# Patient Record
Sex: MALE | Race: WHITE | Employment: FULL TIME | URBAN - METROPOLITAN AREA
[De-identification: names, ages, dates, MRNs, and addresses within clinical notes are randomized per-mention and may not be internally consistent; named-entity substitution may affect disease eponyms.]

---

## 2021-12-13 RX ORDER — CEFAZOLIN SODIUM/WATER 2 G/20 ML
2 SYRINGE (ML) INTRAVENOUS ONCE
Status: CANCELLED | OUTPATIENT
Start: 2021-12-13 | End: 2021-12-13

## 2021-12-15 ENCOUNTER — ANESTHESIA EVENT (OUTPATIENT)
Dept: SURGERY | Age: 66
End: 2021-12-15
Payer: COMMERCIAL

## 2021-12-16 ENCOUNTER — APPOINTMENT (OUTPATIENT)
Dept: GENERAL RADIOLOGY | Age: 66
End: 2021-12-16
Attending: ORTHOPAEDIC SURGERY
Payer: COMMERCIAL

## 2021-12-16 ENCOUNTER — HOSPITAL ENCOUNTER (OUTPATIENT)
Age: 66
Setting detail: OUTPATIENT SURGERY
Discharge: HOME | End: 2021-12-16
Attending: ORTHOPAEDIC SURGERY | Admitting: ORTHOPAEDIC SURGERY
Payer: COMMERCIAL

## 2021-12-16 ENCOUNTER — ANESTHESIA (OUTPATIENT)
Dept: SURGERY | Age: 66
End: 2021-12-16
Payer: COMMERCIAL

## 2021-12-16 VITALS
DIASTOLIC BLOOD PRESSURE: 97 MMHG | RESPIRATION RATE: 12 BRPM | OXYGEN SATURATION: 97 % | SYSTOLIC BLOOD PRESSURE: 169 MMHG | BODY MASS INDEX: 40.62 KG/M2 | HEART RATE: 74 BPM | WEIGHT: 315 LBS | TEMPERATURE: 98.1 F

## 2021-12-16 DIAGNOSIS — G89.18 ACUTE POST-OPERATIVE PAIN: Primary | ICD-10-CM

## 2021-12-16 LAB
GLUCOSE BLD STRIP.AUTO-MCNC: 113 MG/DL (ref 65–100)
POTASSIUM BLD-SCNC: 3.9 MMOL/L (ref 3.5–5.1)
SERVICE CMNT-IMP: ABNORMAL

## 2021-12-16 PROCEDURE — 74011250637 HC RX REV CODE- 250/637: Performed by: ANESTHESIOLOGY

## 2021-12-16 PROCEDURE — 77030002933 HC SUT MCRYL J&J -A: Performed by: ORTHOPAEDIC SURGERY

## 2021-12-16 PROCEDURE — 2709999900 HC NON-CHARGEABLE SUPPLY: Performed by: ORTHOPAEDIC SURGERY

## 2021-12-16 PROCEDURE — 76210000021 HC REC RM PH II 0.5 TO 1 HR: Performed by: ORTHOPAEDIC SURGERY

## 2021-12-16 PROCEDURE — 76060000032 HC ANESTHESIA 0.5 TO 1 HR: Performed by: ORTHOPAEDIC SURGERY

## 2021-12-16 PROCEDURE — 20680 REMOVAL OF IMPLANT DEEP: CPT | Performed by: ORTHOPAEDIC SURGERY

## 2021-12-16 PROCEDURE — 82962 GLUCOSE BLOOD TEST: CPT

## 2021-12-16 PROCEDURE — 74011250636 HC RX REV CODE- 250/636: Performed by: ORTHOPAEDIC SURGERY

## 2021-12-16 PROCEDURE — 84132 ASSAY OF SERUM POTASSIUM: CPT

## 2021-12-16 PROCEDURE — 74011250636 HC RX REV CODE- 250/636: Performed by: ANESTHESIOLOGY

## 2021-12-16 PROCEDURE — 74011000250 HC RX REV CODE- 250: Performed by: ORTHOPAEDIC SURGERY

## 2021-12-16 PROCEDURE — 76010000154 HC OR TIME FIRST 0.5 HR: Performed by: ORTHOPAEDIC SURGERY

## 2021-12-16 PROCEDURE — 74011000250 HC RX REV CODE- 250: Performed by: NURSE ANESTHETIST, CERTIFIED REGISTERED

## 2021-12-16 PROCEDURE — 76210000063 HC OR PH I REC FIRST 0.5 HR: Performed by: ORTHOPAEDIC SURGERY

## 2021-12-16 PROCEDURE — 74011250636 HC RX REV CODE- 250/636: Performed by: NURSE ANESTHETIST, CERTIFIED REGISTERED

## 2021-12-16 PROCEDURE — 77030000032 HC CUF TRNQT ZIMM -B: Performed by: ORTHOPAEDIC SURGERY

## 2021-12-16 RX ORDER — OXYCODONE HYDROCHLORIDE 5 MG/1
5 TABLET ORAL
Status: DISCONTINUED | OUTPATIENT
Start: 2021-12-16 | End: 2021-12-16 | Stop reason: HOSPADM

## 2021-12-16 RX ORDER — HYDROMORPHONE HYDROCHLORIDE 2 MG/ML
0.5 INJECTION, SOLUTION INTRAMUSCULAR; INTRAVENOUS; SUBCUTANEOUS
Status: DISCONTINUED | OUTPATIENT
Start: 2021-12-16 | End: 2021-12-16 | Stop reason: HOSPADM

## 2021-12-16 RX ORDER — ONDANSETRON 2 MG/ML
4 INJECTION INTRAMUSCULAR; INTRAVENOUS
Status: DISCONTINUED | OUTPATIENT
Start: 2021-12-16 | End: 2021-12-16 | Stop reason: HOSPADM

## 2021-12-16 RX ORDER — ACETAMINOPHEN 500 MG
1000 TABLET ORAL ONCE
Status: COMPLETED | OUTPATIENT
Start: 2021-12-16 | End: 2021-12-16

## 2021-12-16 RX ORDER — SODIUM CHLORIDE 0.9 % (FLUSH) 0.9 %
5-40 SYRINGE (ML) INJECTION EVERY 8 HOURS
Status: DISCONTINUED | OUTPATIENT
Start: 2021-12-16 | End: 2021-12-16 | Stop reason: HOSPADM

## 2021-12-16 RX ORDER — SODIUM CHLORIDE 0.9 % (FLUSH) 0.9 %
5-40 SYRINGE (ML) INJECTION AS NEEDED
Status: DISCONTINUED | OUTPATIENT
Start: 2021-12-16 | End: 2021-12-16 | Stop reason: HOSPADM

## 2021-12-16 RX ORDER — ALBUTEROL SULFATE 0.83 MG/ML
2.5 SOLUTION RESPIRATORY (INHALATION) AS NEEDED
Status: DISCONTINUED | OUTPATIENT
Start: 2021-12-16 | End: 2021-12-16 | Stop reason: HOSPADM

## 2021-12-16 RX ORDER — MIDAZOLAM HYDROCHLORIDE 1 MG/ML
2 INJECTION, SOLUTION INTRAMUSCULAR; INTRAVENOUS
Status: DISCONTINUED | OUTPATIENT
Start: 2021-12-16 | End: 2021-12-16 | Stop reason: HOSPADM

## 2021-12-16 RX ORDER — KETOROLAC TROMETHAMINE 30 MG/ML
INJECTION, SOLUTION INTRAMUSCULAR; INTRAVENOUS AS NEEDED
Status: DISCONTINUED | OUTPATIENT
Start: 2021-12-16 | End: 2021-12-16 | Stop reason: HOSPADM

## 2021-12-16 RX ORDER — BUPIVACAINE HYDROCHLORIDE 5 MG/ML
INJECTION, SOLUTION EPIDURAL; INTRACAUDAL AS NEEDED
Status: DISCONTINUED | OUTPATIENT
Start: 2021-12-16 | End: 2021-12-16 | Stop reason: HOSPADM

## 2021-12-16 RX ORDER — PROPOFOL 10 MG/ML
INJECTION, EMULSION INTRAVENOUS AS NEEDED
Status: DISCONTINUED | OUTPATIENT
Start: 2021-12-16 | End: 2021-12-16 | Stop reason: HOSPADM

## 2021-12-16 RX ORDER — LIDOCAINE HYDROCHLORIDE 10 MG/ML
0.1 INJECTION INFILTRATION; PERINEURAL AS NEEDED
Status: DISCONTINUED | OUTPATIENT
Start: 2021-12-16 | End: 2021-12-16 | Stop reason: HOSPADM

## 2021-12-16 RX ORDER — SODIUM CHLORIDE, SODIUM LACTATE, POTASSIUM CHLORIDE, CALCIUM CHLORIDE 600; 310; 30; 20 MG/100ML; MG/100ML; MG/100ML; MG/100ML
1000 INJECTION, SOLUTION INTRAVENOUS CONTINUOUS
Status: DISCONTINUED | OUTPATIENT
Start: 2021-12-16 | End: 2021-12-16 | Stop reason: HOSPADM

## 2021-12-16 RX ORDER — CEPHALEXIN 500 MG/1
500 CAPSULE ORAL 4 TIMES DAILY
Qty: 12 CAPSULE | Refills: 0 | Status: SHIPPED | OUTPATIENT
Start: 2021-12-16

## 2021-12-16 RX ORDER — HYDROCODONE BITARTRATE AND ACETAMINOPHEN 5; 325 MG/1; MG/1
1 TABLET ORAL
Qty: 20 TABLET | Refills: 0 | Status: SHIPPED | OUTPATIENT
Start: 2021-12-16 | End: 2021-12-21

## 2021-12-16 RX ORDER — LIDOCAINE HYDROCHLORIDE 20 MG/ML
INJECTION, SOLUTION EPIDURAL; INFILTRATION; INTRACAUDAL; PERINEURAL AS NEEDED
Status: DISCONTINUED | OUTPATIENT
Start: 2021-12-16 | End: 2021-12-16 | Stop reason: HOSPADM

## 2021-12-16 RX ADMIN — KETOROLAC TROMETHAMINE 30 MG: 30 INJECTION, SOLUTION INTRAMUSCULAR at 14:21

## 2021-12-16 RX ADMIN — ACETAMINOPHEN 1000 MG: 500 TABLET ORAL at 11:18

## 2021-12-16 RX ADMIN — PROPOFOL 120 MCG/KG/MIN: 10 INJECTION, EMULSION INTRAVENOUS at 14:02

## 2021-12-16 RX ADMIN — LIDOCAINE HYDROCHLORIDE 60 MG: 20 INJECTION, SOLUTION EPIDURAL; INFILTRATION; INTRACAUDAL; PERINEURAL at 14:00

## 2021-12-16 RX ADMIN — Medication 3 G: at 14:06

## 2021-12-16 RX ADMIN — PROPOFOL 50 MG: 10 INJECTION, EMULSION INTRAVENOUS at 14:01

## 2021-12-16 RX ADMIN — SODIUM CHLORIDE, SODIUM LACTATE, POTASSIUM CHLORIDE, AND CALCIUM CHLORIDE 1000 ML: 600; 310; 30; 20 INJECTION, SOLUTION INTRAVENOUS at 11:19

## 2021-12-16 NOTE — H&P
Outpatient Surgery History and Physical:  Raymond Stone was seen and examined. CHIEF COMPLAINT:   Left ankle. PE:     Visit Vitals  BP (!) 157/88 (BP 1 Location: Right arm, BP Patient Position: Sitting)   Temp 98.6 °F (37 °C)   Resp 18   Wt 325 lb (147.4 kg)   SpO2 97%   BMI 40.62 kg/m²       Heart:   Regular rhythm      Lungs:  Are clear      Past Medical History: There are no problems to display for this patient. Surgical History:   Past Surgical History:   Procedure Laterality Date    HX COLONOSCOPY      x4     HX LAP CHOLECYSTECTOMY  2018    HX ORTHOPAEDIC      left ankle screws    HX OTHER SURGICAL      cysts on back removed outpatient     HX TONSILLECTOMY         Social History: Patient  reports that he has never smoked. He has never used smokeless tobacco. He reports current alcohol use of about 3.0 standard drinks of alcohol per week. He reports previous drug use. Family History: No family history on file. Allergies: Reviewed per EMR  Allergies   Allergen Reactions    Codeine Itching       Medications:    No current facility-administered medications on file prior to encounter. No current outpatient medications on file prior to encounter. The surgery is planned for the LEFT ANKLE HARDWARE REMOVAL . History and physical has been reviewed. The patient has been examined. There have been no significant clinical changes since the completion of the originally dated History and Physical.  Patient identified by surgeon; surgical site was confirmed by patient and surgeon. The patient is here today for outpatient surgery. I have examined the patient, no changes are noted in the patient's medical status. Necessity for the procedure/care is still present and the history and physical above is current. See the office notes for the full long term history of the problem. Please see the recent office notes for the musculoskeletal examination.     Signed By: Shani Feliciano DEBBIE     December 16, 2021 10:45 AM

## 2021-12-16 NOTE — BRIEF OP NOTE
Brief Postoperative Note    Patient: Kermit Martinez  YOB: 1955  MRN: [id number]    Date of Procedure: 12/16/2021     Pre-Op Diagnosis: Painful orthopaedic hardware [facility]) [Y88.64ZD]  Post-traumatic arthritis of left ankle [M19.172]    Post-Op Diagnosis: Same as preoperative diagnosis.       Procedure(s):  LEFT ANKLE HARDWARE REMOVAL LOCAL W/ MAC    Surgeon(s):  Lani Doss MD    Surgical Assistant: Nurse Practitioner: Ruben Diallo, Bishnu Scott RN    Anesthesia: MAC     Estimated Blood Loss (mL): Minimal    Complications: None    Specimens: * No specimens in log *     Implants: * No implants in log *    Drains: * No LDAs found *    Findings:     Electronically Signed by Arsenio Paget, MD on 12/16/2021 at 2:23 PM

## 2021-12-16 NOTE — DISCHARGE INSTRUCTIONS
INSTRUCTIONS FOLLOWING FOOT SURGERY    ACTIVITY  Elevate foot. No Ice    2.)Protected partial weight bearing on the heel only as tolerated in post op shoe after full sensation returns. Blood clot prevention:  As instructed by Dr Nicole Helton: Take one 325mg aspirin daily if okay with your medical doctor and you have no GI ulcer. Get up and out of bed frequently. While in bed move the legs as much as possible. DRESSING CARE Keep dry and in place until follow up appointment. Cover with cast bag or plastic bag when showering. Let the office know if dressing gets saturated with water. Don't put anything into the splint to relieve itching etc.     CALL YOUR DOCTOR IF YOU HAVE  Excessive bleeding that does not stop after holding mild pressure over the area. Temperature of 101 degrees or above. Redness, excessive swelling or bruising, and/or green or yellow, smelly discharge from incision. Loss of sensation - cold, white or blue toes. DIET  Day of Surgery: Clear liquids until no nausea or vomiting; then light, bland diet (Baked chicken, plain rice, grits, scrambled egg, toast). Nothing Greasy, fried or spicy today  Advance to regular diet on second day, unless your doctor orders otherwise. PAIN  Take pain medications as directed by your doctor. Call your doctor if pain is NOT relieved by medication. MEDICATION INTERACTION:  During your procedure you potentially received a medication or medications which may reduce the effectiveness of oral contraceptives. Please consider other forms of contraception for 1 month following your procedure if you are currently using oral contraceptives as your primary form of birth control.  In addition to this, we recommend continuing your oral contraceptive as prescribed, unless otherwise instructed by your physician, during this time    After general anesthesia or intravenous sedation, for 24 hours or while taking prescription Narcotics:  · Limit your activities  · A responsible adult needs to be with you for the next 24 hours  · Do not drive and operate hazardous machinery  · Do not make important personal or business decisions  · Do not drink alcoholic beverages  · If you have not urinated within 8 hours after discharge, and you are experiencing discomfort from urinary retention, please go to the nearest ED. · If you have sleep apnea and have a CPAP machine, please use it for all naps and sleeping. · Please use caution when taking narcotics and any of your home medications that may cause drowsiness. *  Please give a list of your current medications to your Primary Care Provider. *  Please update this list whenever your medications are discontinued, doses are      changed, or new medications (including over-the-counter products) are added. *  Please carry medication information at all times in case of emergency situations. These are general instructions for a healthy lifestyle:  No smoking/ No tobacco products/ Avoid exposure to second hand smoke  Surgeon General's Warning:  Quitting smoking now greatly reduces serious risk to your health. Obesity, smoking, and sedentary lifestyle greatly increases your risk for illness  A healthy diet, regular physical exercise & weight monitoring are important for maintaining a healthy lifestyle    You may be retaining fluid if you have a history of heart failure or if you experience any of the following symptoms:  Weight gain of 3 pounds or more overnight or 5 pounds in a week, increased swelling in our hands or feet or shortness of breath while lying flat in bed. Please call your doctor as soon as you notice any of these symptoms; do not wait until your next office visit.

## 2021-12-16 NOTE — OP NOTES
FULL OP NOTE    PATIENT NAME: Melissa Downing  MRN: [id number]    DATE OF SURGERY: 12/16/2021    PREOPERATIVE DIAGNOSIS: Painful orthopaedic hardware [facility]) [T84.84XA]  Post-traumatic arthritis of left ankle [M19.172]      POSTOPERATIVE DIAGNOSIS: Painful orthopaedic hardware [facility]) [T84.84XA]  Post-traumatic arthritis of left ankle [M19.172]      PROCEDURE: Left ankle hardware removal, 20680    SURGEON: Marin Perze MD    ASSISTANT: Nova Dobbs NP  An assistant was required for positioning, retraction, and wound closure for this procedure. HARDWARE: * No implants in log *  INDICATIONS: This patient is a 60y.o. year old male with a history of Painful orthopaedic hardware ([address] [T84.84XA]  Post-traumatic arthritis of left ankle [M19.172] who has failed conservative therapy and desires surgical treatment. Risks and benefits of the procedure including, but not limited to, anesthetic complications as well as surgical complications including damage to nerves and blood vessels, risk of infection, risk of incomplete pain relief, risk of malunion, nonunion and need for additional surgery have been discussed with the patient who wishes to proceed. PROCEDURE IN DETAIL: A time out was done to confirm the operating procedure, surgeon, patient and site. During a preop surgical timeout the left lower extremity was identified as the correct surgical site and prepped and draped in a standard sterile fashion using ChloraPrep solution. A field block was obtained with 0.75 percent plain Marcaine. A small portion of the patient's previous lateral approach to the fibula was an open. The underlying 3 syndesmosis screws were removed without difficulty. These were confirmed be adequately viewed under image intensification. The wound was then irrigated and closed using Monocryl nylon sutures. A sterile dressing was then applied. Anesthesia was discontinued. The patient was transferred back to recovery bed.   He was taken to recovery in satisfactory condition. Appeared to tolerate the procedure well. There were no apparent trouble or anesthetic complications. All needle and sponge counts were correct. TOURNIQUET TIME: Approx 10 minutes. SPECIMENS: none    ESTIMATED BLOOD LOSS: min mL.

## 2021-12-16 NOTE — ANESTHESIA PREPROCEDURE EVALUATION
Relevant Problems   No relevant active problems       Anesthetic History   No history of anesthetic complications            Review of Systems / Medical History  Patient summary reviewed and pertinent labs reviewed    Pulmonary        Sleep apnea           Neuro/Psych   Within defined limits           Cardiovascular    Hypertension              Exercise tolerance: >4 METS     GI/Hepatic/Renal     GERD           Endo/Other    Diabetes: well controlled    Morbid obesity and arthritis     Other Findings              Physical Exam    Airway  Mallampati: II  TM Distance: 4 - 6 cm         Cardiovascular    Rhythm: regular  Rate: normal         Dental  No notable dental hx       Pulmonary  Breath sounds clear to auscultation               Abdominal         Other Findings            Anesthetic Plan    ASA: 3  Anesthesia type: total IV anesthesia          Induction: Intravenous  Anesthetic plan and risks discussed with: Patient      Localized by surgeon

## 2021-12-16 NOTE — ANESTHESIA POSTPROCEDURE EVALUATION
Procedure(s):  LEFT ANKLE HARDWARE REMOVAL LOCAL W/ MAC.    total IV anesthesia    Anesthesia Post Evaluation      Multimodal analgesia: multimodal analgesia used between 6 hours prior to anesthesia start to PACU discharge  Patient location during evaluation: bedside  Patient participation: complete - patient participated  Level of consciousness: awake and responsive to light touch  Pain management: adequate  Airway patency: patent  Anesthetic complications: no  Cardiovascular status: acceptable, hemodynamically stable, blood pressure returned to baseline and stable  Respiratory status: acceptable, unassisted, spontaneous ventilation and nonlabored ventilation  Hydration status: acceptable  Post anesthesia nausea and vomiting:  none  Final Post Anesthesia Temperature Assessment:  Normothermia (36.0-37.5 degrees C)      INITIAL Post-op Vital signs:   Vitals Value Taken Time   /89 12/16/21 1440   Temp 36.7 °C (98.1 °F) 12/16/21 1430   Pulse 70 12/16/21 1442   Resp 12 12/16/21 1430   SpO2 98 % 12/16/21 1442   Vitals shown include unvalidated device data.

## 2022-07-29 ENCOUNTER — OFFICE VISIT (OUTPATIENT)
Dept: ORTHOPEDIC SURGERY | Age: 67
End: 2022-07-29
Payer: COMMERCIAL

## 2022-07-29 DIAGNOSIS — M19.079 ANKLE ARTHRITIS: Primary | ICD-10-CM

## 2022-07-29 PROCEDURE — 20605 DRAIN/INJ JOINT/BURSA W/O US: CPT | Performed by: ORTHOPAEDIC SURGERY

## 2022-07-29 RX ORDER — METHYLPREDNISOLONE ACETATE 40 MG/ML
40 INJECTION, SUSPENSION INTRA-ARTICULAR; INTRALESIONAL; INTRAMUSCULAR; SOFT TISSUE ONCE
Status: COMPLETED | OUTPATIENT
Start: 2022-07-29 | End: 2022-07-29

## 2022-07-29 RX ADMIN — METHYLPREDNISOLONE ACETATE 40 MG: 40 INJECTION, SUSPENSION INTRA-ARTICULAR; INTRALESIONAL; INTRAMUSCULAR; SOFT TISSUE at 10:08

## 2023-05-08 ENCOUNTER — OFFICE VISIT (OUTPATIENT)
Dept: ORTHOPEDIC SURGERY | Age: 68
End: 2023-05-08

## 2023-05-08 DIAGNOSIS — M19.079 ANKLE ARTHRITIS: Primary | ICD-10-CM

## 2023-05-08 RX ORDER — METHYLPREDNISOLONE ACETATE 40 MG/ML
40 INJECTION, SUSPENSION INTRA-ARTICULAR; INTRALESIONAL; INTRAMUSCULAR; SOFT TISSUE ONCE
Status: COMPLETED | OUTPATIENT
Start: 2023-05-08 | End: 2023-05-08

## 2023-05-08 RX ADMIN — METHYLPREDNISOLONE ACETATE 40 MG: 40 INJECTION, SUSPENSION INTRA-ARTICULAR; INTRALESIONAL; INTRAMUSCULAR; SOFT TISSUE at 13:41

## 2023-05-08 NOTE — PROGRESS NOTES
Name: Basil Armendariz  YOB: 1955  Gender: male  MRN: [id number]    Summary:   Left posttraumatic ankle arthritis       CC: Follow-up (Left ankle pain)       HPI: Basil Armendariz is a 78 y.o. male who presents with Follow-up (Left ankle pain)  . This patient presents back to the office today with complaints of left ankle arthritis pain    History was obtained by Patient     ROS/Meds/PSH/PMH/FH/SH: I personally reviewed the patients standard intake form. Below are the pertinents    Tobacco:  reports that he has never smoked. He has never used smokeless tobacco.  Diabetes: None      Physical Examination:    Exam of the left foot and ankle shows some limited range of motion the tibiotalar joint with anterior medial ankle joint pain    Imaging:   No imaging reviewed           Quita Interiano III, MD           Assessment:   Left posttraumatic ankle arthritis    Treatment Plan:   4 This is a chronic illness/condition with exacerbation and progression  Treatment at this time: Minor Procedure: Injection done today: The patient understands the risks and complications associated with injection. After sterile prep of the area, the Left ankle joint was injected with 3 cc. of Xylocaine and 3 cc. of steroid. . 40mg Depo Medrol was the steroid used. Patient tolerated it well. I discussed the risk of infection and skin blanching. I told the be patient be careful about the symptoms of hyperglycemia such as GI distress, polyuria, excessive thirst and lethargy. If these symptoms occur they should present to an primary care doctor or urgent facility  Studies ordered: NO XR needed @ Next Visit    Weight-bearing status: WBAT        Return to work/work restrictions: none  No medications given    A decision was made today to proceed with injection into the left ankle which was performed out difficulty. We discussed continued conservative treatment for him over ankle fusion.

## 2023-10-06 ENCOUNTER — OFFICE VISIT (OUTPATIENT)
Dept: ORTHOPEDIC SURGERY | Age: 68
End: 2023-10-06

## 2023-10-06 DIAGNOSIS — M19.079 ANKLE ARTHRITIS: Primary | ICD-10-CM

## 2023-10-06 RX ORDER — CALCIUM CITRATE/VITAMIN D3 200MG-6.25
TABLET ORAL
COMMUNITY
Start: 2023-10-01

## 2023-10-06 RX ORDER — LIRAGLUTIDE 6 MG/ML
INJECTION SUBCUTANEOUS
COMMUNITY
Start: 2023-09-12

## 2023-10-06 RX ORDER — PREGABALIN 100 MG/1
CAPSULE ORAL
COMMUNITY
Start: 2023-09-19

## 2023-10-06 RX ORDER — LOSARTAN POTASSIUM AND HYDROCHLOROTHIAZIDE 25; 100 MG/1; MG/1
1 TABLET ORAL DAILY
COMMUNITY
Start: 2021-09-16

## 2023-10-06 RX ORDER — PEN NEEDLE, DIABETIC 32GX 5/32"
NEEDLE, DISPOSABLE MISCELLANEOUS
COMMUNITY
Start: 2023-08-14

## 2023-10-06 RX ORDER — AMLODIPINE BESYLATE 5 MG/1
TABLET ORAL
COMMUNITY
Start: 2023-09-25

## 2023-10-06 RX ORDER — METHYLPREDNISOLONE ACETATE 40 MG/ML
40 INJECTION, SUSPENSION INTRA-ARTICULAR; INTRALESIONAL; INTRAMUSCULAR; SOFT TISSUE ONCE
Status: COMPLETED | OUTPATIENT
Start: 2023-10-06 | End: 2023-10-06

## 2023-10-06 RX ORDER — ALBUTEROL SULFATE 2.5 MG/3ML
2.5 SOLUTION RESPIRATORY (INHALATION) EVERY 6 HOURS PRN
COMMUNITY
Start: 2023-01-27

## 2023-10-06 RX ADMIN — METHYLPREDNISOLONE ACETATE 40 MG: 40 INJECTION, SUSPENSION INTRA-ARTICULAR; INTRALESIONAL; INTRAMUSCULAR; SOFT TISSUE at 09:29

## 2023-11-07 ENCOUNTER — TELEPHONE (OUTPATIENT)
Dept: ORTHOPEDIC SURGERY | Age: 68
End: 2023-11-07

## 2023-11-07 NOTE — TELEPHONE ENCOUNTER
Please call patient,  said shot didn't help at all , didn't know what he can do , is there a different type of inj

## 2023-11-07 NOTE — TELEPHONE ENCOUNTER
Patient states the injection he had on 10-06-23 did not help at all. Patient is Type II diabetic and wonders if he can have a MDP. I told patient I would speak to Dr. Lissa Nicole tomorrow and call him back.

## 2023-11-08 NOTE — TELEPHONE ENCOUNTER
Due to patient having Type II diabetes he will be unable to have a MDP. Per Dr. Gilford Fleck he can try a brace Edy Tay or [place] brace) or he will need to discuss an ankle fusion.

## 2023-11-13 ENCOUNTER — TELEPHONE (OUTPATIENT)
Dept: ORTHOPEDIC SURGERY | Age: 68
End: 2023-11-13

## 2023-11-13 NOTE — TELEPHONE ENCOUNTER
He states he never heard back from anyone regarding the injection not helping. I read the note from [facility] and he states he needs an appointment to be seen. Call transferred to appointments.

## 2023-11-22 ENCOUNTER — OFFICE VISIT (OUTPATIENT)
Dept: ORTHOPEDIC SURGERY | Age: 68
End: 2023-11-22
Payer: MEDICARE

## 2023-11-22 DIAGNOSIS — M19.079 ANKLE ARTHRITIS: ICD-10-CM

## 2023-11-22 DIAGNOSIS — M19.172 POST-TRAUMATIC OSTEOARTHRITIS, LEFT ANKLE AND FOOT: Primary | ICD-10-CM

## 2023-11-22 PROCEDURE — 3017F COLORECTAL CA SCREEN DOC REV: CPT | Performed by: ORTHOPAEDIC SURGERY

## 2023-11-22 PROCEDURE — 4004F PT TOBACCO SCREEN RCVD TLK: CPT | Performed by: ORTHOPAEDIC SURGERY

## 2023-11-22 PROCEDURE — 99214 OFFICE O/P EST MOD 30 MIN: CPT | Performed by: ORTHOPAEDIC SURGERY

## 2023-11-22 PROCEDURE — 1123F ACP DISCUSS/DSCN MKR DOCD: CPT | Performed by: ORTHOPAEDIC SURGERY

## 2023-11-22 PROCEDURE — 20605 DRAIN/INJ JOINT/BURSA W/O US: CPT | Performed by: ORTHOPAEDIC SURGERY

## 2023-11-22 PROCEDURE — G8421 BMI NOT CALCULATED: HCPCS | Performed by: ORTHOPAEDIC SURGERY

## 2023-11-22 PROCEDURE — G8428 CUR MEDS NOT DOCUMENT: HCPCS | Performed by: ORTHOPAEDIC SURGERY

## 2023-11-22 PROCEDURE — G8484 FLU IMMUNIZE NO ADMIN: HCPCS | Performed by: ORTHOPAEDIC SURGERY

## 2023-11-22 RX ORDER — METHYLPREDNISOLONE ACETATE 40 MG/ML
40 INJECTION, SUSPENSION INTRA-ARTICULAR; INTRALESIONAL; INTRAMUSCULAR; SOFT TISSUE ONCE
Status: COMPLETED | OUTPATIENT
Start: 2023-11-22 | End: 2023-11-22

## 2023-11-22 RX ADMIN — METHYLPREDNISOLONE ACETATE 40 MG: 40 INJECTION, SUSPENSION INTRA-ARTICULAR; INTRALESIONAL; INTRAMUSCULAR; SOFT TISSUE at 11:53

## 2023-11-22 NOTE — PROGRESS NOTES
Name: Lesly Montoya  YOB: 1955  Gender: male  MRN: [id number]    Summary:   Left end-stage ankle arthritis       CC: Follow-up (Recheck left ankle )       HPI: Lesly Montoya is a 78 y.o. male who presents with Follow-up (Recheck left ankle )  . This patient presents back to the office today with continued complaints of left ankle pain and swelling with his known history of arthritis. The last injection was not very helpful and actually her removal a bit more than usual.    History was obtained by Patient     ROS/Meds/PSH/PMH/FH/SH: I personally reviewed the patients standard intake form. Below are the pertinents    Tobacco:  reports that he has never smoked. He has never used smokeless tobacco.  Diabetes: None      Physical Examination:    The left foot and ankle shows very limited range of motion of tibiotalar joint. He has tenderness to palpation at the anteromedial joint line. There are palpable pulses and intact sensation. Imaging:   No imaging reviewed          Assessment:   Left end-stage ankle arthritis    Treatment Plan:   4 This is a chronic illness/condition with exacerbation and progression  Treatment at this time: Minor Procedure: Injection done today: The patient understands the risks and complications associated with injection. After sterile prep of the area, the Left ankle joint was injected with 3 cc. of Xylocaine and 3 cc. of steroid. . 40mg Depo Medrol was the steroid used. Patient tolerated it well. I discussed the risk of infection and skin blanching. I told the be patient be careful about the symptoms of hyperglycemia such as GI distress, polyuria, excessive thirst and lethargy.   If these symptoms occur they should present to an primary care doctor or urgent facility  Studies ordered: NO XR needed @ Next Visit    Weight-bearing status: WBAT        Return to work/work restrictions: none  No medications given    A decision was made today to proceed with injection of the

## 2024-02-29 RX ORDER — BLOOD PRESSURE TEST KIT
KIT MISCELLANEOUS DAILY
COMMUNITY
Start: 2023-05-09

## 2024-02-29 RX ORDER — PEN NEEDLE, DIABETIC 32GX 5/32"
NEEDLE, DISPOSABLE MISCELLANEOUS DAILY
COMMUNITY
Start: 2023-05-11

## 2024-03-01 ENCOUNTER — OFFICE VISIT (OUTPATIENT)
Dept: ORTHOPEDIC SURGERY | Age: 69
End: 2024-03-01

## 2024-03-01 DIAGNOSIS — M19.079 ANKLE ARTHRITIS: ICD-10-CM

## 2024-03-01 DIAGNOSIS — M25.572 LEFT ANKLE PAIN, UNSPECIFIED CHRONICITY: Primary | ICD-10-CM

## 2024-03-01 NOTE — PROGRESS NOTES
Name: Dirk John  YOB: 1955  Gender: male  MRN: [id number]    Summary:   Left end-stage hindfoot arthritis       CC: Follow-up (Left ankle)       HPI: Dirk John is a 68 y.o. male who presents with Follow-up (Left ankle)  .  This patient returns back the office today with increasing complaints of pain located in his left hindfoot.  His last hemoglobin A1c was about 8%.    History was obtained by Patient     ROS/Meds/PSH/PMH/FH/SH: I personally reviewed the patients standard intake form.  Below are the pertinents    Tobacco:  reports that he has never smoked. He has never used smokeless tobacco.  Diabetes: Diabetic - non insulin      Physical Examination:    Exam of the left foot and ankle shows very limited tibiotalar and subtalar joint range of motion.    Imaging:   Interpretation of imaging  Left ankle XR: AP, Lateral, Oblique views     ICD-10-CM    1. Left ankle pain, unspecified chronicity  M25.572 XR ANKLE LEFT (MIN 3 VIEWS)      2. Ankle arthritis  M19.079          Report: AP, lateral, oblique x-ray of the left ankle demonstrates hindfoot arthritis    Impression: Hindfoot arthritis   CECILIO INMAN III, MD           Assessment:   Left hindfoot arthritis    Treatment Plan:   4 This is a chronic illness/condition with exacerbation and progression  Treatment at this time: Minor Procedure: Injection done today: The patient understands the risks and complications associated with injection. After sterile prep of the area, the Left ankle joint was injected with 3 cc. of Xylocaine and 3 cc. of steroid.. 40mg Depo Medrol was the steroid used.  Patient tolerated it well.  I discussed the risk of infection and skin blanching.  I told the be patient be careful about the symptoms of hyperglycemia such as GI distress, polyuria, excessive thirst and lethargy.  If these symptoms occur they should present to an primary care doctor or urgent facility  Studies ordered: NO XR needed @ Next

## 2024-07-17 ENCOUNTER — TELEPHONE (OUTPATIENT)
Dept: ORTHOPEDIC SURGERY | Age: 69
End: 2024-07-17

## 2024-07-17 NOTE — TELEPHONE ENCOUNTER
Please call patient,  he would like to come in before aug 5 for a ck and injection , he is going to [place]

## 2024-07-18 NOTE — TELEPHONE ENCOUNTER
Called and spoke with pt. Pt was scheduled for 07/31/24 @ [address] at [address] office. Pt voiced understanding.

## 2024-07-31 ENCOUNTER — OFFICE VISIT (OUTPATIENT)
Dept: ORTHOPEDIC SURGERY | Age: 69
End: 2024-07-31

## 2024-07-31 ENCOUNTER — PATIENT MESSAGE (OUTPATIENT)
Dept: ORTHOPEDIC SURGERY | Age: 69
End: 2024-07-31

## 2024-07-31 DIAGNOSIS — M19.072 PRIMARY OSTEOARTHRITIS OF LEFT FOOT: ICD-10-CM

## 2024-07-31 DIAGNOSIS — M19.172 POST-TRAUMATIC OSTEOARTHRITIS OF LEFT ANKLE: ICD-10-CM

## 2024-07-31 DIAGNOSIS — M25.572 LEFT ANKLE PAIN, UNSPECIFIED CHRONICITY: Primary | ICD-10-CM

## 2024-07-31 RX ORDER — METHYLPREDNISOLONE ACETATE 40 MG/ML
40 INJECTION, SUSPENSION INTRA-ARTICULAR; INTRALESIONAL; INTRAMUSCULAR; SOFT TISSUE ONCE
Status: COMPLETED | OUTPATIENT
Start: 2024-07-31 | End: 2024-07-31

## 2024-07-31 RX ADMIN — METHYLPREDNISOLONE ACETATE 80 MG: 40 INJECTION, SUSPENSION INTRA-ARTICULAR; INTRALESIONAL; INTRAMUSCULAR; SOFT TISSUE at 12:10

## 2024-07-31 NOTE — PROGRESS NOTES
Name: Dirk John  YOB: 1955  Gender: male  MRN: [id number]    CC: Left ankle pain    HPI:   07/31/2024: New patient visit: Left ankle pain    ROS/Meds/PSH/PMH/FH/SH: only reviewed pertinent/relevant information today    Tobacco:  reports that he has never smoked. He has never used smokeless tobacco.     Physical Examination:  Patient appears to be alert and oriented with acceptable appearance.  No obvious distress or SOB  CV: LE acceptable vascular flow, color and capillary refill  Neuro: LE mostly intact light touch sensation   Skin: Left = healed incisions; ankle/hindfoot thickening  MS: Standing: Plantigrade: Gait protected  Left = anterior ankle pain > lateral hindfoot pain  Left = limited ankle/hindfoot motion    XR: Left: Standing AP lateral mortise ankle plus AP oblique foot taken today with collapsed ankle arthritis, retained hardware with broken syndesmotic screws; hindfoot subtalar joint arthritis; arterial calcifications  XR Impression:  As above      Reviewed Test/Records/Documents:   03/01/2024: Dr. Tate: Reflects: Left end-stage hindfoot arthritis: Discussed hindfoot fusion doretha: Ankle injection    Timeout: I confirmed with the patient and Angelica:   All are in agreement with: Correct patient, planned procedure, procedure site and laterality: Left ankle joint  Injection: We discussed risks/complications of injection: After acceptance and sterile prep: Left ankle joint injected 2 cc Xylocaine, 80 mg Depo-Medrol; he appeared to do well    Assessment:    Left posttraumatic ankle arthritis with retained hardware  Left subtalar arthritis    Plan:   The patient and I discussed the above assessment. We explored treatment options.     He reports with dorsiflexion of his ankle and toes a sensation of pain in his posterior lateral ankle  I suspect that relates to FHL stretch through posterior arthritic ankle/hindfoot  He has no evidence of FHL or peroneal deficiencies    Radiographically and

## 2024-08-01 NOTE — TELEPHONE ENCOUNTER
Called and left message for pt. Pt was informed form will be ready for him to  tomorrow at the [address] office.

## 2024-08-28 ENCOUNTER — OFFICE VISIT (OUTPATIENT)
Dept: ORTHOPEDIC SURGERY | Age: 69
End: 2024-08-28

## 2024-08-28 VITALS — BODY MASS INDEX: 39.17 KG/M2 | HEIGHT: 75 IN | WEIGHT: 315 LBS

## 2024-08-28 DIAGNOSIS — M19.172 POST-TRAUMATIC OSTEOARTHRITIS OF LEFT ANKLE: ICD-10-CM

## 2024-08-28 DIAGNOSIS — M19.172 POST-TRAUMATIC OSTEOARTHRITIS, LEFT ANKLE AND FOOT: Primary | ICD-10-CM

## 2024-08-28 NOTE — PROGRESS NOTES
Name: Dirk John  YOB: 1955  Gender: male  MRN: [id number]    CC: Left ankle pain    HPI:   07/31/2024: New patient visit: Left ankle pain  08/28/2024: Left outer ankle/foot pain     ROS/Meds/PSH/PMH/FH/SH: only reviewed pertinent/relevant information today    Tobacco:  reports that he has never smoked. He has never used smokeless tobacco.     Physical Examination:  Patient appears to be alert and oriented with acceptable appearance.  No obvious distress or SOB  CV: LE acceptable vascular flow, color and capillary refill  Neuro: LE mostly intact light touch sensation   Skin: Left = healed incisions; ankle/hindfoot thickening  MS: Standing: Plantigrade: Gait full  Left = lateral hindfoot pain  Left = limited ankle/hindfoot motion    XR: Left: Standing AP lateral mortise ankle plus AP oblique foot taken 07/31/2024 with collapsed ankle arthritis, retained hardware with broken syndesmotic screws; hindfoot subtalar joint arthritis; arterial calcifications  XR Impression:  As above      Reviewed Test/Records/Documents:   03/01/2024: Dr. Tate: Reflects: Left end-stage hindfoot arthritis: Discussed hindfoot fusion doretha: Ankle injection  07/31/2024: Left ankle joint injected 2 cc Xylocaine, 80 mg Depo-Medrol; he appeared to do well    Timeout: I confirmed with the patient and Carol Ann:   All are in agreement with: Correct patient, planned procedure, procedure site and laterality: Left hindfoot joint  Injection: We discussed risks/complications of injection: After acceptance and sterile prep: Left hindfoot joint injected 2 cc Xylocaine, 80 mg Depo-Medrol; he appeared to do well    Assessment:    Left posttraumatic ankle arthritis with retained hardware  Left subtalar arthritis    Plan:   The patient and I discussed the above assessment. We explored treatment options.     07/31/2024: Initial visit notations:  He reports with dorsiflexion of his ankle and toes a sensation of pain in his posterior lateral  recognition software which may result in errors of speech and spelling recognition and word/phrase syntax errors.

## 2025-01-27 ENCOUNTER — OFFICE VISIT (OUTPATIENT)
Dept: ORTHOPEDIC SURGERY | Age: 70
End: 2025-01-27

## 2025-01-27 DIAGNOSIS — M19.172 POST-TRAUMATIC OSTEOARTHRITIS OF LEFT ANKLE: Primary | ICD-10-CM

## 2025-01-27 NOTE — PROGRESS NOTES
Name: Dirk John  YOB: 1955  Gender: male  MRN: [id number]    Summary:   Left hindfoot arthritis       CC: New Patient (NP, Left Ankle, MET referral )       HPI: Dirk John is a 69 y.o. male who presents with New Patient (NP, Left Ankle, MET referral )  .  This patient returns back to the office today with increased complaints of left hindfoot arthritis pain.  He saw Dr. Forbes several months ago and had injections with some benefit.  His last hemoglobin A1c was 7.4%.    History was obtained by Patient     ROS/Meds/PSH/PMH/FH/SH: I personally reviewed the patients standard intake form.  Below are the pertinents    Tobacco:  reports that he has never smoked. He has never used smokeless tobacco.  Diabetes: Diabetic - non insulin      Physical Examination:    Exam of the left lower extremity shows a severely fixed hindfoot with no tibiotalar joint or subtalar joint range of motion.  He has pain in the sinus Tarsi as well as the ankle.  He has palpable pulses and well-healed surgical incision without sign of infection.    Imaging:   No imaging reviewed           CECILIO INMAN III, MD           Assessment:   Left severe hindfoot arthritis    Treatment Plan:   4 This is a chronic illness/condition with exacerbation and progression  Treatment at this time: Minor Procedure: Injection done today: The patient understands the risks and complications associated with injection. After sterile prep of the area, the Left ankle joint, hindfoot joint was injected with 3 cc. of Xylocaine and 3 cc. of steroid.. 40mg Depo Medrol was the steroid used.  Patient tolerated it well.  I discussed the risk of infection and skin blanching.  I told the be patient be careful about the symptoms of hyperglycemia such as GI distress, polyuria, excessive thirst and lethargy.  If these symptoms occur they should present to an primary care doctor or urgent facility and Elective major surgery with procedural risk

## 2025-04-28 ENCOUNTER — OFFICE VISIT (OUTPATIENT)
Dept: ORTHOPEDIC SURGERY | Age: 70
End: 2025-04-28
Payer: MEDICARE

## 2025-04-28 DIAGNOSIS — M19.072 PRIMARY OSTEOARTHRITIS OF LEFT FOOT: ICD-10-CM

## 2025-04-28 DIAGNOSIS — M19.172 POST-TRAUMATIC OSTEOARTHRITIS, LEFT ANKLE AND FOOT: ICD-10-CM

## 2025-04-28 DIAGNOSIS — M19.172 POST-TRAUMATIC OSTEOARTHRITIS OF LEFT ANKLE: ICD-10-CM

## 2025-04-28 DIAGNOSIS — M19.072 ARTHRITIS OF LEFT ANKLE: Primary | ICD-10-CM

## 2025-04-28 PROCEDURE — G8417 CALC BMI ABV UP PARAM F/U: HCPCS | Performed by: ORTHOPAEDIC SURGERY

## 2025-04-28 PROCEDURE — 99214 OFFICE O/P EST MOD 30 MIN: CPT | Performed by: ORTHOPAEDIC SURGERY

## 2025-04-28 PROCEDURE — 20605 DRAIN/INJ JOINT/BURSA W/O US: CPT | Performed by: ORTHOPAEDIC SURGERY

## 2025-04-28 PROCEDURE — G8428 CUR MEDS NOT DOCUMENT: HCPCS | Performed by: ORTHOPAEDIC SURGERY

## 2025-04-28 PROCEDURE — 3017F COLORECTAL CA SCREEN DOC REV: CPT | Performed by: ORTHOPAEDIC SURGERY

## 2025-04-28 PROCEDURE — 1123F ACP DISCUSS/DSCN MKR DOCD: CPT | Performed by: ORTHOPAEDIC SURGERY

## 2025-04-28 PROCEDURE — 1036F TOBACCO NON-USER: CPT | Performed by: ORTHOPAEDIC SURGERY

## 2025-04-28 RX ORDER — METHYLPREDNISOLONE ACETATE 40 MG/ML
40 INJECTION, SUSPENSION INTRA-ARTICULAR; INTRALESIONAL; INTRAMUSCULAR; SOFT TISSUE ONCE
Status: COMPLETED | OUTPATIENT
Start: 2025-04-28 | End: 2025-04-28

## 2025-04-28 RX ADMIN — METHYLPREDNISOLONE ACETATE 40 MG: 40 INJECTION, SUSPENSION INTRA-ARTICULAR; INTRALESIONAL; INTRAMUSCULAR; SOFT TISSUE at 10:47

## 2025-04-28 NOTE — PROGRESS NOTES
Name: Dirk John  YOB: 1955  Gender: male  MRN: [id number]    Summary:   Left ankle and subtalar joint arthritis       CC: Follow-up (Left hindfoot arthritis)       HPI: Dirk John is a 69 y.o. male who presents with Follow-up (Left hindfoot arthritis)  .  This patient presents to the office today with continued complaints about left hindfoot arthritis pain.  He did get significant benefit for the last round of injections.  Unfortunately he is not unsurprising he got turned down for a spinal cord stimulator by [employer] and is currently in the appeals process for this.    History was obtained by Patient     ROS/Meds/PSH/PMH/FH/SH: I personally reviewed the patients standard intake form.  Below are the pertinents    Tobacco:  reports that he has never smoked. He has never used smokeless tobacco.  Diabetes: Diabetic - non insulin      Physical Examination:    Exam of the left lower extremity shows very limited hindfoot range of motion with pain at the sinus Tarsi as well as the tibiotalar joint.    Imaging:   No imaging reviewed           CECILIO INMAN III, MD           Assessment:   Left severe hindfoot arthritis    Treatment Plan:   4 This is a chronic illness/condition with exacerbation and progression  Treatment at this time: Minor Procedure: Injection done today: The patient understands the risks and complications associated with injection. After sterile prep of the area, the Left ankle joint, hindfoot joint was injected with 3 cc. of Xylocaine and 3 cc. of steroid.. 40mg Depo Medrol was the steroid used.  Patient tolerated it well.  I discussed the risk of infection and skin blanching.  I told the be patient be careful about the symptoms of hyperglycemia such as GI distress, polyuria, excessive thirst and lethargy.  If these symptoms occur they should present to an primary care doctor or urgent facility  Studies ordered: NO XR needed @ Next Visit    Weight-bearing status: WBAT        Return

## 2025-07-28 ENCOUNTER — OFFICE VISIT (OUTPATIENT)
Dept: ORTHOPEDIC SURGERY | Age: 70
End: 2025-07-28

## 2025-07-28 DIAGNOSIS — M19.172 POST-TRAUMATIC OSTEOARTHRITIS OF LEFT ANKLE: Primary | ICD-10-CM

## 2025-07-28 DIAGNOSIS — M19.072 PRIMARY OSTEOARTHRITIS OF LEFT FOOT: ICD-10-CM

## 2025-07-28 RX ORDER — METHYLPREDNISOLONE ACETATE 40 MG/ML
40 INJECTION, SUSPENSION INTRA-ARTICULAR; INTRALESIONAL; INTRAMUSCULAR; SOFT TISSUE ONCE
Status: COMPLETED | OUTPATIENT
Start: 2025-07-28 | End: 2025-07-28

## 2025-07-28 RX ADMIN — METHYLPREDNISOLONE ACETATE 40 MG: 40 INJECTION, SUSPENSION INTRA-ARTICULAR; INTRALESIONAL; INTRAMUSCULAR; SOFT TISSUE at 14:14

## 2025-07-28 NOTE — PROGRESS NOTES
Name: Dirk John  YOB: 1955  Gender: male  MRN: [id number]    Summary:     Left hindfoot arthritis     CC: Follow-up (Summary: /Left ankle and subtalar joint arthritis//)       HPI: Dirk John is a 69 y.o. male who presents with Follow-up (Summary: /Left ankle and subtalar joint arthritis//)  .  This patient presents by the office today with a longstanding history of left hindfoot arthritis.  He is apparently finally gotten approval potentially get a spinal cord stimulator which unfortunately took a  to get involved with in order to get accomplished.    History was obtained by Patient     ROS/Meds/PSH/PMH/FH/SH: I personally reviewed the patients standard intake form.  Below are the pertinents    Tobacco:  reports that he has never smoked. He has never used smokeless tobacco.  Diabetes: None      Physical Examination:  Exam of the left lower extremity shows limited range of motion of the hindfoot and tibiotalar joints.  He has palpable pulses and intact sensation.      Imaging:   No imaging reviewed           CECILIO INMAN III, MD           Assessment:   Left hindfoot arthritis    Treatment Plan:   4 This is a chronic illness/condition with exacerbation and progression  Treatment at this time: Minor Procedure: Injection done today: The patient understands the risks and complications associated with injection. After sterile prep of the area, the Left ankle joint, hindfoot joint was injected with 3 cc. of Xylocaine and 3 cc. of steroid.. 40mg Depo Medrol was the steroid used.  Patient tolerated it well.  I discussed the risk of infection and skin blanching.  I told the be patient be careful about the symptoms of hyperglycemia such as GI distress, polyuria, excessive thirst and lethargy.  If these symptoms occur they should present to an primary care doctor or urgent facility  Studies ordered: NO XR needed @ Next Visit    Weight-bearing status: WBAT        Return to work/work restrictions:

## (undated) DEVICE — BUTTON SWITCH PENCIL BLADE ELECTRODE, HOLSTER: Brand: EDGE

## (undated) DEVICE — PREP SKN CHLRAPRP APL 26ML STR --

## (undated) DEVICE — PADDING CAST W2INXL4YD ST COT COHESIVE HND TEARABLE SPEC

## (undated) DEVICE — REM POLYHESIVE ADULT PATIENT RETURN ELECTRODE: Brand: VALLEYLAB

## (undated) DEVICE — SOLUTION IRRIG 1000ML 09% SOD CHL USP PIC PLAS CONTAINER

## (undated) DEVICE — SUTURE MCRYL SZ 3-0 L27IN ABSRB UD L19MM PS-2 3/8 CIR PRIM Y427H

## (undated) DEVICE — ZIMMER® STERILE DISPOSABLE TOURNIQUET CUFF WITH PLC, DUAL PORT, SINGLE BLADDER, 18 IN. (46 CM)

## (undated) DEVICE — BANDAGE,ELASTIC,ESMARK,STERILE,4"X9',LF: Brand: MEDLINE

## (undated) DEVICE — DRAPE C ARM W54XL84IN MINI FOR OEC 6800

## (undated) DEVICE — BANDAGE,GAUZE,CONFORMING,2"X75",STRL,LF: Brand: MEDLINE INDUSTRIES, INC.

## (undated) DEVICE — AMD ANTIMICROBIAL GAUZE SPONGES,12 PLY USP TYPE VII, 0.2% POLYHEXAMETHYLENE BIGUANIDE HCI (PHMB): Brand: CURITY

## (undated) DEVICE — 3M™ COBAN™ NL STERILE NON-LATEX SELF-ADHERENT WRAP, 2082S, 2 IN X 5 YD (5 CM X 4,5 M), 36 ROLLS/CASE: Brand: 3M™ COBAN™

## (undated) DEVICE — DRSG GZ OIL EMUL CURAD 3X8 --

## (undated) DEVICE — FOOT & ANKLE SOFT DR WOMACK: Brand: MEDLINE INDUSTRIES, INC.